# Patient Record
Sex: MALE | Race: WHITE | NOT HISPANIC OR LATINO
[De-identification: names, ages, dates, MRNs, and addresses within clinical notes are randomized per-mention and may not be internally consistent; named-entity substitution may affect disease eponyms.]

---

## 2017-01-24 ENCOUNTER — FORM ENCOUNTER (OUTPATIENT)
Age: 62
End: 2017-01-24

## 2017-01-25 ENCOUNTER — APPOINTMENT (OUTPATIENT)
Dept: ORTHOPEDIC SURGERY | Facility: CLINIC | Age: 62
End: 2017-01-25

## 2017-01-25 ENCOUNTER — OUTPATIENT (OUTPATIENT)
Dept: OUTPATIENT SERVICES | Facility: HOSPITAL | Age: 62
LOS: 1 days | End: 2017-01-25
Payer: COMMERCIAL

## 2017-01-25 DIAGNOSIS — Z98.89 OTHER SPECIFIED POSTPROCEDURAL STATES: Chronic | ICD-10-CM

## 2017-01-25 DIAGNOSIS — Z90.89 ACQUIRED ABSENCE OF OTHER ORGANS: Chronic | ICD-10-CM

## 2017-01-25 DIAGNOSIS — Z90.49 ACQUIRED ABSENCE OF OTHER SPECIFIED PARTS OF DIGESTIVE TRACT: Chronic | ICD-10-CM

## 2017-01-25 PROCEDURE — 72020 X-RAY EXAM OF SPINE 1 VIEW: CPT | Mod: 26

## 2017-01-25 PROCEDURE — 73521 X-RAY EXAM HIPS BI 2 VIEWS: CPT

## 2017-01-25 PROCEDURE — 73523 X-RAY EXAM HIPS BI 5/> VIEWS: CPT | Mod: 26

## 2017-01-25 PROCEDURE — 72020 X-RAY EXAM OF SPINE 1 VIEW: CPT

## 2017-02-21 ENCOUNTER — RX RENEWAL (OUTPATIENT)
Age: 62
End: 2017-02-21

## 2017-05-03 ENCOUNTER — APPOINTMENT (OUTPATIENT)
Dept: ORTHOPEDIC SURGERY | Facility: CLINIC | Age: 62
End: 2017-05-03

## 2017-06-11 ENCOUNTER — FORM ENCOUNTER (OUTPATIENT)
Age: 62
End: 2017-06-11

## 2017-06-12 ENCOUNTER — OUTPATIENT (OUTPATIENT)
Dept: OUTPATIENT SERVICES | Facility: HOSPITAL | Age: 62
LOS: 1 days | End: 2017-06-12
Payer: COMMERCIAL

## 2017-06-12 DIAGNOSIS — Z98.89 OTHER SPECIFIED POSTPROCEDURAL STATES: Chronic | ICD-10-CM

## 2017-06-12 DIAGNOSIS — M16.12 UNILATERAL PRIMARY OSTEOARTHRITIS, LEFT HIP: ICD-10-CM

## 2017-06-12 DIAGNOSIS — Z90.89 ACQUIRED ABSENCE OF OTHER ORGANS: Chronic | ICD-10-CM

## 2017-06-12 DIAGNOSIS — Z90.49 ACQUIRED ABSENCE OF OTHER SPECIFIED PARTS OF DIGESTIVE TRACT: Chronic | ICD-10-CM

## 2017-06-12 LAB
ANION GAP SERPL CALC-SCNC: 16 MMOL/L — SIGNIFICANT CHANGE UP (ref 5–17)
APPEARANCE UR: CLEAR — SIGNIFICANT CHANGE UP
APTT BLD: 22.6 SEC — LOW (ref 27.5–37.4)
BACTERIA # UR AUTO: (no result) /HPF
BILIRUB UR-MCNC: NEGATIVE — SIGNIFICANT CHANGE UP
BUN SERPL-MCNC: 11 MG/DL — SIGNIFICANT CHANGE UP (ref 7–23)
CALCIUM SERPL-MCNC: 9.5 MG/DL — SIGNIFICANT CHANGE UP (ref 8.4–10.5)
CHLORIDE SERPL-SCNC: 102 MMOL/L — SIGNIFICANT CHANGE UP (ref 96–108)
CO2 SERPL-SCNC: 24 MMOL/L — SIGNIFICANT CHANGE UP (ref 22–31)
COLOR SPEC: YELLOW — SIGNIFICANT CHANGE UP
CREAT SERPL-MCNC: 0.8 MG/DL — SIGNIFICANT CHANGE UP (ref 0.5–1.3)
DIFF PNL FLD: (no result)
EPI CELLS # UR: SIGNIFICANT CHANGE UP /HPF
GLUCOSE SERPL-MCNC: 99 MG/DL — SIGNIFICANT CHANGE UP (ref 70–99)
GLUCOSE UR QL: NEGATIVE — SIGNIFICANT CHANGE UP
HBA1C BLD-MCNC: 5.1 % — SIGNIFICANT CHANGE UP (ref 4–5.6)
HCT VFR BLD CALC: 42.9 % — SIGNIFICANT CHANGE UP (ref 39–50)
HGB BLD-MCNC: 14.5 G/DL — SIGNIFICANT CHANGE UP (ref 13–17)
INR BLD: 0.98 — SIGNIFICANT CHANGE UP (ref 0.88–1.16)
KETONES UR-MCNC: NEGATIVE — SIGNIFICANT CHANGE UP
LEUKOCYTE ESTERASE UR-ACNC: NEGATIVE — SIGNIFICANT CHANGE UP
MCHC RBC-ENTMCNC: 29.2 PG — SIGNIFICANT CHANGE UP (ref 27–34)
MCHC RBC-ENTMCNC: 33.8 G/DL — SIGNIFICANT CHANGE UP (ref 32–36)
MCV RBC AUTO: 86.5 FL — SIGNIFICANT CHANGE UP (ref 80–100)
NITRITE UR-MCNC: NEGATIVE — SIGNIFICANT CHANGE UP
PH UR: 5.5 — SIGNIFICANT CHANGE UP (ref 5–8)
PLATELET # BLD AUTO: 216 K/UL — SIGNIFICANT CHANGE UP (ref 150–400)
POTASSIUM SERPL-MCNC: 4.2 MMOL/L — SIGNIFICANT CHANGE UP (ref 3.5–5.3)
POTASSIUM SERPL-SCNC: 4.2 MMOL/L — SIGNIFICANT CHANGE UP (ref 3.5–5.3)
PROT UR-MCNC: NEGATIVE MG/DL — SIGNIFICANT CHANGE UP
PROTHROM AB SERPL-ACNC: 10.9 SEC — SIGNIFICANT CHANGE UP (ref 9.8–12.7)
RBC # BLD: 4.96 M/UL — SIGNIFICANT CHANGE UP (ref 4.2–5.8)
RBC # FLD: 13.1 % — SIGNIFICANT CHANGE UP (ref 10.3–16.9)
RBC CASTS # UR COMP ASSIST: (no result) /HPF
SODIUM SERPL-SCNC: 142 MMOL/L — SIGNIFICANT CHANGE UP (ref 135–145)
SP GR SPEC: 1.02 — SIGNIFICANT CHANGE UP (ref 1–1.03)
UROBILINOGEN FLD QL: 0.2 E.U./DL — SIGNIFICANT CHANGE UP
WBC # BLD: 7.4 K/UL — SIGNIFICANT CHANGE UP (ref 3.8–10.5)
WBC # FLD AUTO: 7.4 K/UL — SIGNIFICANT CHANGE UP (ref 3.8–10.5)
WBC UR QL: < 5 /HPF — SIGNIFICANT CHANGE UP

## 2017-06-12 PROCEDURE — 87086 URINE CULTURE/COLONY COUNT: CPT

## 2017-06-12 PROCEDURE — 93005 ELECTROCARDIOGRAM TRACING: CPT

## 2017-06-12 PROCEDURE — 81001 URINALYSIS AUTO W/SCOPE: CPT

## 2017-06-12 PROCEDURE — 85610 PROTHROMBIN TIME: CPT

## 2017-06-12 PROCEDURE — 83036 HEMOGLOBIN GLYCOSYLATED A1C: CPT

## 2017-06-12 PROCEDURE — 80048 BASIC METABOLIC PNL TOTAL CA: CPT

## 2017-06-12 PROCEDURE — 85730 THROMBOPLASTIN TIME PARTIAL: CPT

## 2017-06-12 PROCEDURE — 93010 ELECTROCARDIOGRAM REPORT: CPT

## 2017-06-12 PROCEDURE — 71046 X-RAY EXAM CHEST 2 VIEWS: CPT

## 2017-06-12 PROCEDURE — 85027 COMPLETE CBC AUTOMATED: CPT

## 2017-06-12 PROCEDURE — 71020: CPT | Mod: 26

## 2017-06-13 LAB
CULTURE RESULTS: NO GROWTH — SIGNIFICANT CHANGE UP
SPECIMEN SOURCE: SIGNIFICANT CHANGE UP

## 2017-06-14 ENCOUNTER — OUTPATIENT (OUTPATIENT)
Dept: OUTPATIENT SERVICES | Facility: HOSPITAL | Age: 62
LOS: 1 days | End: 2017-06-14
Payer: COMMERCIAL

## 2017-06-14 DIAGNOSIS — Z90.89 ACQUIRED ABSENCE OF OTHER ORGANS: Chronic | ICD-10-CM

## 2017-06-14 DIAGNOSIS — Z98.89 OTHER SPECIFIED POSTPROCEDURAL STATES: Chronic | ICD-10-CM

## 2017-06-14 DIAGNOSIS — Z22.321 CARRIER OR SUSPECTED CARRIER OF METHICILLIN SUSCEPTIBLE STAPHYLOCOCCUS AUREUS: ICD-10-CM

## 2017-06-14 DIAGNOSIS — Z90.49 ACQUIRED ABSENCE OF OTHER SPECIFIED PARTS OF DIGESTIVE TRACT: Chronic | ICD-10-CM

## 2017-06-14 PROCEDURE — 87641 MR-STAPH DNA AMP PROBE: CPT

## 2017-06-17 LAB
MRSA PCR RESULT.: NEGATIVE — SIGNIFICANT CHANGE UP
S AUREUS DNA NOSE QL NAA+PROBE: NEGATIVE — SIGNIFICANT CHANGE UP

## 2017-06-18 ENCOUNTER — FORM ENCOUNTER (OUTPATIENT)
Age: 62
End: 2017-06-18

## 2017-06-19 ENCOUNTER — OUTPATIENT (OUTPATIENT)
Dept: OUTPATIENT SERVICES | Facility: HOSPITAL | Age: 62
LOS: 1 days | End: 2017-06-19
Payer: COMMERCIAL

## 2017-06-19 ENCOUNTER — APPOINTMENT (OUTPATIENT)
Dept: INTERNAL MEDICINE | Facility: CLINIC | Age: 62
End: 2017-06-19

## 2017-06-19 VITALS
BODY MASS INDEX: 38.35 KG/M2 | OXYGEN SATURATION: 97 % | HEART RATE: 65 BPM | WEIGHT: 275 LBS | DIASTOLIC BLOOD PRESSURE: 80 MMHG | TEMPERATURE: 98.2 F | SYSTOLIC BLOOD PRESSURE: 110 MMHG

## 2017-06-19 DIAGNOSIS — M17.11 UNILATERAL PRIMARY OSTEOARTHRITIS, RIGHT KNEE: ICD-10-CM

## 2017-06-19 DIAGNOSIS — Z90.89 ACQUIRED ABSENCE OF OTHER ORGANS: Chronic | ICD-10-CM

## 2017-06-19 DIAGNOSIS — Z98.89 OTHER SPECIFIED POSTPROCEDURAL STATES: Chronic | ICD-10-CM

## 2017-06-19 DIAGNOSIS — Z90.49 ACQUIRED ABSENCE OF OTHER SPECIFIED PARTS OF DIGESTIVE TRACT: Chronic | ICD-10-CM

## 2017-06-19 DIAGNOSIS — Z79.01 LONG TERM (CURRENT) USE OF ANTICOAGULANTS: ICD-10-CM

## 2017-06-19 DIAGNOSIS — Z01.818 ENCOUNTER FOR OTHER PREPROCEDURAL EXAMINATION: ICD-10-CM

## 2017-06-19 DIAGNOSIS — A04.7 ENTEROCOLITIS DUE TO CLOSTRIDIUM DIFFICILE: ICD-10-CM

## 2017-06-19 PROCEDURE — 73700 CT LOWER EXTREMITY W/O DYE: CPT

## 2017-06-19 PROCEDURE — 73700 CT LOWER EXTREMITY W/O DYE: CPT | Mod: 26,LT

## 2017-06-19 RX ORDER — FLUOCINONIDE 0.05 MG/G
0.05 OINTMENT TOPICAL
Qty: 60 | Refills: 0 | Status: ACTIVE | COMMUNITY
Start: 2017-04-08

## 2017-06-19 RX ORDER — UREA 20 %
20 CREAM (GRAM) TOPICAL
Qty: 85 | Refills: 0 | Status: ACTIVE | COMMUNITY
Start: 2016-06-17

## 2017-06-21 ENCOUNTER — MEDICATION RENEWAL (OUTPATIENT)
Age: 62
End: 2017-06-21

## 2017-06-21 DIAGNOSIS — G89.29 PAIN IN LEFT HIP: ICD-10-CM

## 2017-06-21 DIAGNOSIS — M25.552 PAIN IN LEFT HIP: ICD-10-CM

## 2017-06-22 ENCOUNTER — OTHER (OUTPATIENT)
Age: 62
End: 2017-06-22

## 2017-06-27 VITALS
HEART RATE: 57 BPM | SYSTOLIC BLOOD PRESSURE: 127 MMHG | TEMPERATURE: 98 F | RESPIRATION RATE: 16 BRPM | OXYGEN SATURATION: 98 % | HEIGHT: 71 IN | DIASTOLIC BLOOD PRESSURE: 69 MMHG | WEIGHT: 263.23 LBS

## 2017-06-27 RX ORDER — CELECOXIB 200 MG/1
200 CAPSULE ORAL ONCE
Qty: 0 | Refills: 0 | Status: COMPLETED | OUTPATIENT
Start: 2017-06-28 | End: 2017-06-28

## 2017-06-27 RX ORDER — OXYCODONE HYDROCHLORIDE 5 MG/1
20 TABLET ORAL ONCE
Qty: 0 | Refills: 0 | Status: DISCONTINUED | OUTPATIENT
Start: 2017-06-28 | End: 2017-06-28

## 2017-06-27 NOTE — H&P ADULT - PSH
S/P appendectomy    S/P bladder repair  Bladder Resection  S/P colon resection    S/P tonsillectomy    Status post surgery  right knee cap repair- 1/22/2016, x3

## 2017-06-27 NOTE — H&P ADULT - HISTORY OF PRESENT ILLNESS
62M c/o left hip pain x    Presents today for elective left total hip replacement, robotic assisted 62M c/o left hip pain x 2 years, denies preceding trauma/injury. Pt states his left hip pain radiates to his low back and left knee. His hip pain is exacerbated with walking up and downhill, getting in and out of chairs, and states he has been walking with a limp secondary to pain. Pt states NSAIDs no longer provide him pain relief and he has been participating in physical therapy, pt has thus failed conservative treatment for his symptoms. Pt denies numbness/tingling/weakness of bilateral lower extremities. Pt does not ambulate with an assistive device at baseline. Denies DVT hx; Of note pt has hx of portal vein thrombosis in 2014 for which he was on lovenox x 2 years, pt is no longer on anticoagulation. Pt has hx of right patella ORIF and MONTY with Dr. Willard approximately 2 years ago. Denies CP, SOB, N/V, tactile fevers today.    Presents today for elective left total hip replacement, robotic assisted

## 2017-06-27 NOTE — H&P ADULT - PMH
Bladder neoplasm    BPH (benign prostatic hypertrophy)    Gastritis    GERD (gastroesophageal reflux disease)    OA (osteoarthritis)    Pneumonia  mild

## 2017-06-27 NOTE — PATIENT PROFILE ADULT. - PSH
S/P appendectomy    S/P bladder repair  Bladder Resection  S/P colon resection    S/P tonsillectomy    Status post surgery  right knee cap repair- 1/22/2016, x3  Status post surgery  cyst removal from the bladder-2011 S/P appendectomy    S/P bladder repair  Bladder Resection  S/P colon resection    S/P tonsillectomy    Status post surgery  right knee cap repair- 1/22/2016, x3

## 2017-06-27 NOTE — H&P ADULT - NSHPPHYSICALEXAM_GEN_ALL_CORE
MSK:  +decreased ROM secondary to pain, left hip    Remainder of exam per medical clearance note MSK:  +decreased ROM secondary to pain, left hip  Skin warm and well perfused  EHL/FHL/TA/GS 5/5 motor strength bilateral lower extremities  SLT in tact and equal to distal bilateral lower extremities  DP pulses palpable bilaterally     Remainder of exam per medical clearance note

## 2017-06-27 NOTE — PATIENT PROFILE ADULT. - PMH
Bladder neoplasm    BPH (benign prostatic hypertrophy)    Gastritis    GERD (gastroesophageal reflux disease)    Pneumonia Bladder neoplasm    BPH (benign prostatic hypertrophy)    Gastritis    GERD (gastroesophageal reflux disease)    OA (osteoarthritis)    Pneumonia  mild

## 2017-06-27 NOTE — H&P ADULT - NSHPLABSRESULTS_GEN_ALL_CORE
Preop CBC, BMP, PT/PTT/INR, UA/UCX - WNL per medical clearance   Preop EKG - sinus bradycardia - reviewed by medical clearance   Preop CXR - small left pleural effusion - reviewed by medical clearance

## 2017-06-27 NOTE — H&P ADULT - PROBLEM SELECTOR PLAN 1
Admit to Orthopaedic Service.  Presents today for elective left total hip replacement  Pt medically stable and cleared for procedure today by Dr. Perdue

## 2017-06-28 ENCOUNTER — RESULT REVIEW (OUTPATIENT)
Age: 62
End: 2017-06-28

## 2017-06-28 ENCOUNTER — INPATIENT (INPATIENT)
Facility: HOSPITAL | Age: 62
LOS: 0 days | Discharge: HOME HEALTH | DRG: 470 | End: 2017-06-29
Attending: ORTHOPAEDIC SURGERY | Admitting: ORTHOPAEDIC SURGERY
Payer: COMMERCIAL

## 2017-06-28 ENCOUNTER — APPOINTMENT (OUTPATIENT)
Dept: ORTHOPEDIC SURGERY | Facility: HOSPITAL | Age: 62
End: 2017-06-28

## 2017-06-28 DIAGNOSIS — Z98.89 OTHER SPECIFIED POSTPROCEDURAL STATES: Chronic | ICD-10-CM

## 2017-06-28 DIAGNOSIS — Z90.49 ACQUIRED ABSENCE OF OTHER SPECIFIED PARTS OF DIGESTIVE TRACT: Chronic | ICD-10-CM

## 2017-06-28 DIAGNOSIS — M16.12 UNILATERAL PRIMARY OSTEOARTHRITIS, LEFT HIP: ICD-10-CM

## 2017-06-28 DIAGNOSIS — Z90.89 ACQUIRED ABSENCE OF OTHER ORGANS: Chronic | ICD-10-CM

## 2017-06-28 PROCEDURE — 72170 X-RAY EXAM OF PELVIS: CPT | Mod: 26

## 2017-06-28 PROCEDURE — 27130 TOTAL HIP ARTHROPLASTY: CPT | Mod: LT

## 2017-06-28 RX ORDER — MAGNESIUM HYDROXIDE 400 MG/1
30 TABLET, CHEWABLE ORAL DAILY
Qty: 0 | Refills: 0 | Status: DISCONTINUED | OUTPATIENT
Start: 2017-06-28 | End: 2017-06-29

## 2017-06-28 RX ORDER — POLYETHYLENE GLYCOL 3350 17 G/17G
17 POWDER, FOR SOLUTION ORAL DAILY
Qty: 0 | Refills: 0 | Status: DISCONTINUED | OUTPATIENT
Start: 2017-06-28 | End: 2017-06-29

## 2017-06-28 RX ORDER — TAMSULOSIN HYDROCHLORIDE 0.4 MG/1
0.4 CAPSULE ORAL AT BEDTIME
Qty: 0 | Refills: 0 | Status: DISCONTINUED | OUTPATIENT
Start: 2017-06-28 | End: 2017-06-28

## 2017-06-28 RX ORDER — SENNA PLUS 8.6 MG/1
2 TABLET ORAL AT BEDTIME
Qty: 0 | Refills: 0 | Status: DISCONTINUED | OUTPATIENT
Start: 2017-06-28 | End: 2017-06-29

## 2017-06-28 RX ORDER — ACETAMINOPHEN 500 MG
650 TABLET ORAL EVERY 6 HOURS
Qty: 0 | Refills: 0 | Status: DISCONTINUED | OUTPATIENT
Start: 2017-06-28 | End: 2017-06-29

## 2017-06-28 RX ORDER — PANTOPRAZOLE SODIUM 20 MG/1
40 TABLET, DELAYED RELEASE ORAL DAILY
Qty: 0 | Refills: 0 | Status: DISCONTINUED | OUTPATIENT
Start: 2017-06-28 | End: 2017-06-29

## 2017-06-28 RX ORDER — MORPHINE SULFATE 50 MG/1
4 CAPSULE, EXTENDED RELEASE ORAL
Qty: 0 | Refills: 0 | Status: DISCONTINUED | OUTPATIENT
Start: 2017-06-28 | End: 2017-06-29

## 2017-06-28 RX ORDER — TAMSULOSIN HYDROCHLORIDE 0.4 MG/1
0.4 CAPSULE ORAL
Qty: 0 | Refills: 0 | Status: DISCONTINUED | OUTPATIENT
Start: 2017-06-28 | End: 2017-06-29

## 2017-06-28 RX ORDER — MORPHINE SULFATE 50 MG/1
4 CAPSULE, EXTENDED RELEASE ORAL EVERY 4 HOURS
Qty: 0 | Refills: 0 | Status: DISCONTINUED | OUTPATIENT
Start: 2017-06-28 | End: 2017-06-29

## 2017-06-28 RX ORDER — OXYCODONE HYDROCHLORIDE 5 MG/1
5 TABLET ORAL EVERY 4 HOURS
Qty: 0 | Refills: 0 | Status: DISCONTINUED | OUTPATIENT
Start: 2017-06-28 | End: 2017-06-29

## 2017-06-28 RX ORDER — ONDANSETRON 8 MG/1
4 TABLET, FILM COATED ORAL EVERY 6 HOURS
Qty: 0 | Refills: 0 | Status: DISCONTINUED | OUTPATIENT
Start: 2017-06-28 | End: 2017-06-29

## 2017-06-28 RX ORDER — KETOROLAC TROMETHAMINE 30 MG/ML
15 SYRINGE (ML) INJECTION EVERY 4 HOURS
Qty: 0 | Refills: 0 | Status: DISCONTINUED | OUTPATIENT
Start: 2017-06-28 | End: 2017-06-29

## 2017-06-28 RX ORDER — BENZOCAINE AND MENTHOL 5; 1 G/100ML; G/100ML
1 LIQUID ORAL THREE TIMES A DAY
Qty: 0 | Refills: 0 | Status: DISCONTINUED | OUTPATIENT
Start: 2017-06-28 | End: 2017-06-29

## 2017-06-28 RX ORDER — CELECOXIB 200 MG/1
100 CAPSULE ORAL
Qty: 0 | Refills: 0 | Status: DISCONTINUED | OUTPATIENT
Start: 2017-06-28 | End: 2017-06-29

## 2017-06-28 RX ORDER — BUPIVACAINE 13.3 MG/ML
20 INJECTION, SUSPENSION, LIPOSOMAL INFILTRATION ONCE
Qty: 0 | Refills: 0 | Status: DISCONTINUED | OUTPATIENT
Start: 2017-06-28 | End: 2017-06-29

## 2017-06-28 RX ORDER — CEFAZOLIN SODIUM 1 G
2000 VIAL (EA) INJECTION EVERY 8 HOURS
Qty: 0 | Refills: 0 | Status: COMPLETED | OUTPATIENT
Start: 2017-06-28 | End: 2017-06-29

## 2017-06-28 RX ORDER — SODIUM CHLORIDE 9 MG/ML
1000 INJECTION, SOLUTION INTRAVENOUS
Qty: 0 | Refills: 0 | Status: DISCONTINUED | OUTPATIENT
Start: 2017-06-28 | End: 2017-06-29

## 2017-06-28 RX ORDER — DOCUSATE SODIUM 100 MG
100 CAPSULE ORAL THREE TIMES A DAY
Qty: 0 | Refills: 0 | Status: DISCONTINUED | OUTPATIENT
Start: 2017-06-28 | End: 2017-06-29

## 2017-06-28 RX ORDER — ENOXAPARIN SODIUM 100 MG/ML
30 INJECTION SUBCUTANEOUS EVERY 12 HOURS
Qty: 0 | Refills: 0 | Status: DISCONTINUED | OUTPATIENT
Start: 2017-06-28 | End: 2017-06-29

## 2017-06-28 RX ORDER — OXYCODONE HYDROCHLORIDE 5 MG/1
10 TABLET ORAL EVERY 4 HOURS
Qty: 0 | Refills: 0 | Status: DISCONTINUED | OUTPATIENT
Start: 2017-06-28 | End: 2017-06-29

## 2017-06-28 RX ADMIN — OXYCODONE HYDROCHLORIDE 10 MILLIGRAM(S): 5 TABLET ORAL at 21:36

## 2017-06-28 RX ADMIN — OXYCODONE HYDROCHLORIDE 20 MILLIGRAM(S): 5 TABLET ORAL at 06:58

## 2017-06-28 RX ADMIN — TAMSULOSIN HYDROCHLORIDE 0.4 MILLIGRAM(S): 0.4 CAPSULE ORAL at 15:58

## 2017-06-28 RX ADMIN — CELECOXIB 200 MILLIGRAM(S): 200 CAPSULE ORAL at 06:58

## 2017-06-28 RX ADMIN — SENNA PLUS 2 TABLET(S): 8.6 TABLET ORAL at 21:36

## 2017-06-28 RX ADMIN — Medication 100 MILLIGRAM(S): at 21:36

## 2017-06-28 RX ADMIN — OXYCODONE HYDROCHLORIDE 5 MILLIGRAM(S): 5 TABLET ORAL at 16:51

## 2017-06-28 RX ADMIN — Medication 100 MILLIGRAM(S): at 16:20

## 2017-06-28 RX ADMIN — OXYCODONE HYDROCHLORIDE 10 MILLIGRAM(S): 5 TABLET ORAL at 22:36

## 2017-06-28 RX ADMIN — OXYCODONE HYDROCHLORIDE 5 MILLIGRAM(S): 5 TABLET ORAL at 15:51

## 2017-06-28 NOTE — PHYSICAL THERAPY INITIAL EVALUATION ADULT - CRITERIA FOR SKILLED THERAPEUTIC INTERVENTIONS
anticipated discharge recommendation/rehab potential/anticipated equipment needs at discharge/impairments found/risk reduction/prevention/therapy frequency/functional limitations in following categories/predicted duration of therapy intervention

## 2017-06-28 NOTE — PHYSICAL THERAPY INITIAL EVALUATION ADULT - ASR EQUIP NEEDS DISCH PT EVAL
Patient owns axillary crutches, straight cane and commode. Patient will need hip kit prior to discharge (hip kit ordered via [facility]). Will assess patient using crutches next session to determine if further equipment is needed

## 2017-06-28 NOTE — PHYSICAL THERAPY INITIAL EVALUATION ADULT - ADDITIONAL COMMENTS
Patient lives with his wife in an apartment, elevator access, no stairs to enter, however patient reports the building does have stairs. Patient reports independence with all functional mobility without the use of an assistive device prior to hospitalization. Patient with a patellar surgery last year and owns a commode, axillary crutches and cane from the surgery.

## 2017-06-28 NOTE — PROGRESS NOTE ADULT - SUBJECTIVE AND OBJECTIVE BOX
Orthopaedic Post Op Check Note    Procedure: Left Total Hip Arthroplasty, Posterior approach  Surgeon: Dr. Ramos    62y Male comfortable, stable and alert in PACU. Pain control adequate at this time. Pt getting up for ambulation with PT as I left the patient. Denies N/V, CP, SOB, numbness/tingling of extremities.    PE: AVSS, NAD  Vital Signs Last 24 Hrs  T(C): 36.1 (28 Jun 2017 13:57), Max: 36.3 (28 Jun 2017 11:20)  T(F): 97 (28 Jun 2017 13:57), Max: 97.4 (28 Jun 2017 12:45)  HR: 53 (28 Jun 2017 13:57) (47 - 69)  BP: 115/70 (28 Jun 2017 13:57) (92/52 - 115/70)  BP(mean): --  RR: 16 (28 Jun 2017 13:57) (14 - 18)  SpO2: 99% (28 Jun 2017 13:57) (94% - 99%)    General: Pt alert and oriented, seated on hospital bed in NAD.  Dressing: C/D/I to Left posterior hip.  Motor: Motor Strength 5/5 to Quad/TA/GS/EHL/FHL bilaterally.   Neuro: Sensation intact to bilateral LE distally.   Pulses: DP/PT 2+, Brisk cap refill, Toes wwp.    Post op X-Ray: new left total hip prosthesis in place without fracture or foreign body     A/P: 62y Male POD #0 s/p Left Total Hip Arthroplasty, posterior approach.  - Stable  - Pain Control   - DVT ppx: Lovenox 30mg subQ BID  - Julia op IV abx: Ancef  - PT, WBS: WBAT, fast track.  - IVF: LR at 100cc/hr.  - F/U AM Labs    Fay Valladares PA-C  Ortho Consult Pager: [phone]

## 2017-06-28 NOTE — PHYSICAL THERAPY INITIAL EVALUATION ADULT - ACTIVE RANGE OF MOTION EXAMINATION, REHAB EVAL
Left UE Active ROM was WFL (within functional limits)/Right LE Active ROM was WFL (within functional limits)/left hip flexion limited to 90 degrees secondary to posterior hip precautions/Right UE Active ROM was WFL (within functional limits)

## 2017-06-29 ENCOUNTER — TRANSCRIPTION ENCOUNTER (OUTPATIENT)
Age: 62
End: 2017-06-29

## 2017-06-29 VITALS
TEMPERATURE: 98 F | SYSTOLIC BLOOD PRESSURE: 126 MMHG | OXYGEN SATURATION: 97 % | HEART RATE: 73 BPM | RESPIRATION RATE: 16 BRPM | DIASTOLIC BLOOD PRESSURE: 74 MMHG

## 2017-06-29 LAB
ANION GAP SERPL CALC-SCNC: 9 MMOL/L — SIGNIFICANT CHANGE UP (ref 5–17)
BUN SERPL-MCNC: 14 MG/DL — SIGNIFICANT CHANGE UP (ref 7–23)
CALCIUM SERPL-MCNC: 8.5 MG/DL — SIGNIFICANT CHANGE UP (ref 8.4–10.5)
CHLORIDE SERPL-SCNC: 103 MMOL/L — SIGNIFICANT CHANGE UP (ref 96–108)
CO2 SERPL-SCNC: 23 MMOL/L — SIGNIFICANT CHANGE UP (ref 22–31)
CREAT SERPL-MCNC: 0.8 MG/DL — SIGNIFICANT CHANGE UP (ref 0.5–1.3)
GLUCOSE SERPL-MCNC: 124 MG/DL — HIGH (ref 70–99)
HCT VFR BLD CALC: 32.2 % — LOW (ref 39–50)
HGB BLD-MCNC: 11.1 G/DL — LOW (ref 13–17)
MCHC RBC-ENTMCNC: 29.8 PG — SIGNIFICANT CHANGE UP (ref 27–34)
MCHC RBC-ENTMCNC: 34.5 G/DL — SIGNIFICANT CHANGE UP (ref 32–36)
MCV RBC AUTO: 86.6 FL — SIGNIFICANT CHANGE UP (ref 80–100)
PLATELET # BLD AUTO: 205 K/UL — SIGNIFICANT CHANGE UP (ref 150–400)
POTASSIUM SERPL-MCNC: 4 MMOL/L — SIGNIFICANT CHANGE UP (ref 3.5–5.3)
POTASSIUM SERPL-SCNC: 4 MMOL/L — SIGNIFICANT CHANGE UP (ref 3.5–5.3)
RBC # BLD: 3.72 M/UL — LOW (ref 4.2–5.8)
RBC # FLD: 13.1 % — SIGNIFICANT CHANGE UP (ref 10.3–16.9)
SODIUM SERPL-SCNC: 135 MMOL/L — SIGNIFICANT CHANGE UP (ref 135–145)
WBC # BLD: 7.2 K/UL — SIGNIFICANT CHANGE UP (ref 3.8–10.5)
WBC # FLD AUTO: 7.2 K/UL — SIGNIFICANT CHANGE UP (ref 3.8–10.5)

## 2017-06-29 PROCEDURE — 99253 IP/OBS CNSLTJ NEW/EST LOW 45: CPT

## 2017-06-29 RX ORDER — MAGNESIUM HYDROXIDE 400 MG/1
30 TABLET, CHEWABLE ORAL
Qty: 0 | Refills: 0 | COMMUNITY
Start: 2017-06-29

## 2017-06-29 RX ORDER — ENOXAPARIN SODIUM 100 MG/ML
30 INJECTION SUBCUTANEOUS
Qty: 0 | Refills: 0 | COMMUNITY
Start: 2017-06-29

## 2017-06-29 RX ORDER — DOCUSATE SODIUM 100 MG
1 CAPSULE ORAL
Qty: 0 | Refills: 0 | COMMUNITY
Start: 2017-06-29

## 2017-06-29 RX ORDER — DICLOFENAC SODIUM 75 MG/1
1 TABLET, DELAYED RELEASE ORAL
Qty: 0 | Refills: 0 | COMMUNITY

## 2017-06-29 RX ORDER — FLUOCINOLONE ACETONIDE 0.25 MG/G
0 CREAM TOPICAL
Qty: 0 | Refills: 0 | COMMUNITY

## 2017-06-29 RX ORDER — CELECOXIB 200 MG/1
0 CAPSULE ORAL
Qty: 0 | Refills: 0 | COMMUNITY

## 2017-06-29 RX ORDER — UREA 15 G
0 POWDER IN PACKET (EA) ORAL
Qty: 0 | Refills: 0 | COMMUNITY

## 2017-06-29 RX ORDER — FERROUS SULFATE 325(65) MG
1 TABLET ORAL
Qty: 0 | Refills: 0 | COMMUNITY

## 2017-06-29 RX ADMIN — TAMSULOSIN HYDROCHLORIDE 0.4 MILLIGRAM(S): 0.4 CAPSULE ORAL at 06:07

## 2017-06-29 RX ADMIN — Medication 15 MILLIGRAM(S): at 03:05

## 2017-06-29 RX ADMIN — OXYCODONE HYDROCHLORIDE 10 MILLIGRAM(S): 5 TABLET ORAL at 11:09

## 2017-06-29 RX ADMIN — PANTOPRAZOLE SODIUM 40 MILLIGRAM(S): 20 TABLET, DELAYED RELEASE ORAL at 11:09

## 2017-06-29 RX ADMIN — OXYCODONE HYDROCHLORIDE 10 MILLIGRAM(S): 5 TABLET ORAL at 12:09

## 2017-06-29 RX ADMIN — Medication 15 MILLIGRAM(S): at 09:00

## 2017-06-29 RX ADMIN — Medication 100 MILLIGRAM(S): at 06:07

## 2017-06-29 RX ADMIN — OXYCODONE HYDROCHLORIDE 10 MILLIGRAM(S): 5 TABLET ORAL at 02:50

## 2017-06-29 RX ADMIN — Medication 650 MILLIGRAM(S): at 06:11

## 2017-06-29 RX ADMIN — Medication 100 MILLIGRAM(S): at 11:09

## 2017-06-29 RX ADMIN — Medication 15 MILLIGRAM(S): at 08:45

## 2017-06-29 RX ADMIN — Medication 100 MILLIGRAM(S): at 00:39

## 2017-06-29 RX ADMIN — OXYCODONE HYDROCHLORIDE 10 MILLIGRAM(S): 5 TABLET ORAL at 06:07

## 2017-06-29 RX ADMIN — Medication 15 MILLIGRAM(S): at 02:47

## 2017-06-29 RX ADMIN — OXYCODONE HYDROCHLORIDE 10 MILLIGRAM(S): 5 TABLET ORAL at 02:00

## 2017-06-29 RX ADMIN — ENOXAPARIN SODIUM 30 MILLIGRAM(S): 100 INJECTION SUBCUTANEOUS at 06:07

## 2017-06-29 RX ADMIN — POLYETHYLENE GLYCOL 3350 17 GRAM(S): 17 POWDER, FOR SOLUTION ORAL at 11:09

## 2017-06-29 NOTE — CONSULT NOTE ADULT - ASSESSMENT
61 yo m s/p left THR, hx of bph, gerd  - hx of protal vein thrombisos  now on lovenox for dvt ppx  - continue flomax  - pain management

## 2017-06-29 NOTE — DISCHARGE NOTE ADULT - MEDICATION SUMMARY - MEDICATIONS TO TAKE
I will START or STAY ON the medications listed below when I get home from the hospital:    magnesium hydroxide 8% oral suspension  -- 30 milliliter(s) by mouth once a day, As needed, Constipation  -- Indication: For Prevent constipation    Flomax 0.4 mg oral capsule  -- 1 cap(s) by mouth 2 times a day  -- Indication: For Home meds    enoxaparin  -- 30 milligram(s) injectable 2 times a day  -- Indication: For Prevent blood clot    docusate sodium 100 mg oral capsule  -- 1 cap(s) by mouth 3 times a day  -- Indication: For stool softener

## 2017-06-29 NOTE — DISCHARGE NOTE ADULT - CARE PROVIDER_API CALL
Fortunato Ramos), Orthopaedic Surgery  [address] [place] [address]  Phone: [phone]  Fax: [phone]

## 2017-06-29 NOTE — OCCUPATIONAL THERAPY INITIAL EVALUATION ADULT - ADDITIONAL COMMENTS
Per patient he was independent with ADL and ambulation PTA, has cane from previous RLE injury, sock aid and raised toilet seat.

## 2017-06-29 NOTE — PROGRESS NOTE ADULT - SUBJECTIVE AND OBJECTIVE BOX
Did well o/n. Mild pain.     Procedure: Left Total Hip Arthroplasty, Posterior approach  Surgeon: Dr. Ramos    62y Male Denies N/V, CP, SOB, numbness/tingling of extremities.    PE: AVSS, NAD  Vital Signs Last 24 Hrs  T(C): 38.1 (29 Jun 2017 04:48), Max: 38.1 (29 Jun 2017 04:48)  T(F): 100.5 (29 Jun 2017 04:48), Max: 100.5 (29 Jun 2017 04:48)  HR: 67 (29 Jun 2017 04:48) (47 - 73)  BP: 104/48 (29 Jun 2017 04:48) (92/52 - 121/69)  BP(mean): --  RR: 16 (29 Jun 2017 04:48) (14 - 18)  SpO2: 97% (29 Jun 2017 04:48) (94% - 99%)    General: Pt alert and oriented, NAD.  Dressing: C/D/I to Left posterior hip.  Motor: Motor Strength 5/5 to Quad/TA/GS/EHL/FHL bilaterally.   Neuro: Sensation intact to bilateral LE distally.   Pulses: DP/PT 2+, Brisk cap refill, Toes wwp.      A/P: 62y Male POD #0 s/p Left Total Hip Arthroplasty, posterior approach.  - Stable  - Pain Control   - DVT ppx: Lovenox 30mg subQ BID  - Julia op IV abx: Ancef  - PT, WBS: WBAT, fast track.  - F/U AM Labs

## 2017-06-29 NOTE — CONSULT NOTE ADULT - SUBJECTIVE AND OBJECTIVE BOX
Patient seen and examined, Chart reviewed    HPI:  62M c/o left hip pain x 2 years, denies preceding trauma/injury. Pt states his left hip pain radiates to his low back and left knee. His hip pain is exacerbated with walking up and downhill, getting in and out of chairs, and states he has been walking with a limp secondary to pain. Pt states NSAIDs no longer provide him pain relief and he has been participating in physical therapy, pt has thus failed conservative treatment for his symptoms. Pt denies numbness/tingling/weakness of bilateral lower extremities. Pt does not ambulate with an assistive device at baseline. Denies DVT hx; Of note pt has hx of portal vein thrombosis in 2014 for which he was on lovenox x 2 years, pt is no longer on anticoagulation. Pt has hx of right patella ORIF and MONTY with Dr. Willard approximately 2 years ago. Denies CP, SOB, N/V, tactile fevers today.    Presents today for elective left total hip replacement, robotic assisted      PAST MEDICAL & SURGICAL HISTORY:  OA (osteoarthritis)  Pneumonia: mild  Bladder neoplasm  Gastritis  GERD (gastroesophageal reflux disease)  BPH (benign prostatic hypertrophy)  Status post surgery: right knee cap repair- 1/22/2016, x3  S/P bladder repair: Bladder Resection  S/P tonsillectomy  S/P appendectomy  S/P colon resection      REVIEW OF SYSTEMS:  CONSTITUTIONAL:  No night sweats.  pos fatigue,  No fever or chills.  RESPIRATORY:  No cough.  No wheeze.    No shortness of breath.  CARDIOVASCULAR:  No chest pains.  No palpitations.  GASTROINTESTINAL:  No abdominal pain.  No nausea or vomiting.  No diarrhea or constipation.    GENITOURINARY:  No urgency.  No frequency.  No dysuria.  No hematuria.    MUSCULOSKELETAL:  pain present both legs    SKIN:  No rashes.      lactated ringers. 1000 milliLiter(s) IV Continuous <Continuous>  enoxaparin Injectable 30 milliGRAM(s) SubCutaneous every 12 hours  acetaminophen   Tablet 650 milliGRAM(s) Oral every 6 hours PRN  celecoxib 100 milliGRAM(s) Oral two times a day after meals  ketorolac   Injectable 15 milliGRAM(s) IV Push every 4 hours PRN  oxyCODONE IR 5 milliGRAM(s) Oral every 4 hours PRN  oxyCODONE IR 10 milliGRAM(s) Oral every 4 hours PRN  aluminum hydroxide/magnesium hydroxide/simethicone Suspension 30 milliLiter(s) Oral four times a day PRN  ondansetron Injectable 4 milliGRAM(s) IV Push every 6 hours PRN  pantoprazole    Tablet 40 milliGRAM(s) Oral daily  polyethylene glycol 3350 17 Gram(s) Oral daily  senna 2 Tablet(s) Oral at bedtime  magnesium hydroxide Suspension 30 milliLiter(s) Oral daily PRN  docusate sodium 100 milliGRAM(s) Oral three times a day  benzocaine 15 mG/menthol 3.6 mG Lozenge 1 Lozenge Oral three times a day PRN  artificial  tears Solution 1 Drop(s) Both EYES three times a day PRN  morphine  - Injectable 4 milliGRAM(s) IV Push every 15 minutes PRN  acetaminophen   Tablet. 650 milliGRAM(s) Oral every 6 hours PRN  morphine  - Injectable 4 milliGRAM(s) IV Push every 4 hours PRN  BUpivacaine liposome 1.3% Injectable (no eMAR) 20 milliLiter(s) Local Injection once  tamsulosin 0.4 milliGRAM(s) Oral two times a day      Allergies    No Known Allergies      SOCIAL HISTORY: no tob    FAMILY HISTORY:  No pertinent family history in first degree relatives      Vital Signs Last 24 Hrs  T(C): 36.7 (29 Jun 2017 15:34), Max: 38.1 (29 Jun 2017 04:48)  T(F): 98.1 (29 Jun 2017 15:34), Max: 100.5 (29 Jun 2017 04:48)  HR: 73 (29 Jun 2017 15:34) (65 - 73)  BP: 126/74 (29 Jun 2017 15:34) (100/63 - 126/74)  BP(mean): --  RR: 16 (29 Jun 2017 15:34) (16 - 17)  SpO2: 97% (29 Jun 2017 15:34) (96% - 97%)    06-28 @ 07:01 - 06-29 @ 07:00  --------------------------------------------------------  IN: 775 mL / OUT: 1200 mL / NET: -425 mL    06-29 @ 07:01 - 06-29 @ 20:50  --------------------------------------------------------  IN: 0 mL / OUT: 650 mL / NET: -650 mL        PHYSICAL EXAM:   General - NAD, Alert and oriented x 3,   Neck - - No lymphadenopathy  Cardiovascular - RRR no m/r/g, no JVD  Lungs - Clear to auscltation, no use of acessory muscles, no crackles or wheezes.  Skin - No rashes, skin warm and dry, no erythematous areas  Abdomen - Normal bowel sounds, abdomen soft and nontender  Extremeties - No edema,       LABS:                        11.1   7.2   )-----------( 205      ( 29 Jun 2017 06:06 )             32.2     06-29    135  |  103  |  14  ----------------------------<  124<H>  4.0   |  23  |  0.80    Ca    8.5      29 Jun 2017 06:08            RADIOLOGY & ADDITIONAL STUDIES:

## 2017-06-29 NOTE — DISCHARGE NOTE ADULT - PATIENT PORTAL LINK FT
“You can access the Follow[employer] Patient Portal, offered by [facility], by registering with the following website: [email or web address]”

## 2017-06-29 NOTE — OCCUPATIONAL THERAPY INITIAL EVALUATION ADULT - GENERAL OBSERVATIONS, REHAB EVAL
Right hand dominant. Chart reviewed, patient cleared for OT eval by GT Donald. Received seated in hip chair, NAD, +heplock, denies pain.

## 2017-06-29 NOTE — DISCHARGE NOTE ADULT - MEDICATION SUMMARY - MEDICATIONS TO STOP TAKING
I will STOP taking the medications listed below when I get home from the hospital:    naproxen 250 mg oral tablet  -- 1 tab(s) by mouth 2 times a day    Advil 200 mg oral tablet  -- 2 tab(s) by mouth prn, As Needed

## 2017-06-29 NOTE — OCCUPATIONAL THERAPY INITIAL EVALUATION ADULT - MD ORDER
62M c/o left hip pain x 2 years, denies preceding trauma/injury. Pt states his left hip pain radiates to his low back and left knee. His hip pain is exacerbated with walking up and downhill, getting in and out of chairs, and states he has been walking with a limp secondary to pain. Pt states NSAIDs no longer provide him pain relief and he has been participating in physical therapy, pt has thus failed conservative treatment for his symptoms.

## 2017-06-29 NOTE — DISCHARGE NOTE ADULT - HOSPITAL COURSE
Admit 6/28/17  Surgery S/P Left THR (Posterior)  Pre/post-op Antibiotics  DVT prophylaxis  Physical Therapy  Pain Management

## 2017-06-29 NOTE — DISCHARGE NOTE ADULT - ADDITIONAL INSTRUCTIONS
No strenuous activity, heavy lifting, driving, tub bathing, or returning to work until cleared by MD.  You may shower--dressing is waterproof.  Remove dressing after post op day 7, then leave incision open to air.  Follow up with  call to schedule an appt within 10-14 days.  If you don't have a bowel movement by post op day 3, then take Milk of Magnesia (over the counter).  If no bowel movement by at least post op day 5, then use a Dulcolax suppository (over the counter) and/or a Fleets enema--if still no bowel movement, call your MD.  Contact your doctor if you experience: fever greater than 101.5, chills, chest pain, difficulty breathing, bleeding, redness or heat around the incision.   Please take pain meds and Lovenox as prescribed by Dr Ramos.  Please follow up with your primary care provider.

## 2017-06-30 PROCEDURE — 36415 COLL VENOUS BLD VENIPUNCTURE: CPT

## 2017-06-30 PROCEDURE — 86850 RBC ANTIBODY SCREEN: CPT

## 2017-06-30 PROCEDURE — 80048 BASIC METABOLIC PNL TOTAL CA: CPT

## 2017-06-30 PROCEDURE — C1713: CPT

## 2017-06-30 PROCEDURE — 86900 BLOOD TYPING SEROLOGIC ABO: CPT

## 2017-06-30 PROCEDURE — 86901 BLOOD TYPING SEROLOGIC RH(D): CPT

## 2017-06-30 PROCEDURE — 85027 COMPLETE CBC AUTOMATED: CPT

## 2017-06-30 PROCEDURE — 72170 X-RAY EXAM OF PELVIS: CPT

## 2017-06-30 PROCEDURE — 88300 SURGICAL PATH GROSS: CPT

## 2017-06-30 PROCEDURE — C1776: CPT

## 2017-06-30 PROCEDURE — 97116 GAIT TRAINING THERAPY: CPT

## 2017-06-30 PROCEDURE — 97161 PT EVAL LOW COMPLEX 20 MIN: CPT

## 2017-07-03 DIAGNOSIS — Z87.891 PERSONAL HISTORY OF NICOTINE DEPENDENCE: ICD-10-CM

## 2017-07-03 DIAGNOSIS — N40.0 BENIGN PROSTATIC HYPERPLASIA WITHOUT LOWER URINARY TRACT SYMPTOMS: ICD-10-CM

## 2017-07-03 DIAGNOSIS — M16.12 UNILATERAL PRIMARY OSTEOARTHRITIS, LEFT HIP: ICD-10-CM

## 2017-07-03 DIAGNOSIS — R00.1 BRADYCARDIA, UNSPECIFIED: ICD-10-CM

## 2017-07-03 DIAGNOSIS — J90 PLEURAL EFFUSION, NOT ELSEWHERE CLASSIFIED: ICD-10-CM

## 2017-07-03 LAB — SURGICAL PATHOLOGY STUDY: SIGNIFICANT CHANGE UP

## 2017-07-05 PROBLEM — J18.9 PNEUMONIA, UNSPECIFIED ORGANISM: Chronic | Status: ACTIVE | Noted: 2017-06-27

## 2017-07-05 PROBLEM — M19.90 UNSPECIFIED OSTEOARTHRITIS, UNSPECIFIED SITE: Chronic | Status: ACTIVE | Noted: 2017-06-27

## 2017-07-09 ENCOUNTER — RX RENEWAL (OUTPATIENT)
Age: 62
End: 2017-07-09

## 2017-07-13 ENCOUNTER — MEDICATION RENEWAL (OUTPATIENT)
Age: 62
End: 2017-07-13

## 2017-07-14 ENCOUNTER — MEDICATION RENEWAL (OUTPATIENT)
Age: 62
End: 2017-07-14

## 2017-07-17 ENCOUNTER — MEDICATION RENEWAL (OUTPATIENT)
Age: 62
End: 2017-07-17

## 2017-07-19 ENCOUNTER — FORM ENCOUNTER (OUTPATIENT)
Age: 62
End: 2017-07-19

## 2017-07-20 ENCOUNTER — APPOINTMENT (OUTPATIENT)
Dept: ORTHOPEDIC SURGERY | Facility: CLINIC | Age: 62
End: 2017-07-20

## 2017-07-20 ENCOUNTER — OUTPATIENT (OUTPATIENT)
Dept: OUTPATIENT SERVICES | Facility: HOSPITAL | Age: 62
LOS: 1 days | End: 2017-07-20
Payer: COMMERCIAL

## 2017-07-20 DIAGNOSIS — Z98.89 OTHER SPECIFIED POSTPROCEDURAL STATES: Chronic | ICD-10-CM

## 2017-07-20 DIAGNOSIS — Z90.49 ACQUIRED ABSENCE OF OTHER SPECIFIED PARTS OF DIGESTIVE TRACT: Chronic | ICD-10-CM

## 2017-07-20 DIAGNOSIS — Z90.89 ACQUIRED ABSENCE OF OTHER ORGANS: Chronic | ICD-10-CM

## 2017-07-20 PROCEDURE — 73501 X-RAY EXAM HIP UNI 1 VIEW: CPT | Mod: 26,LT

## 2017-07-20 PROCEDURE — 73501 X-RAY EXAM HIP UNI 1 VIEW: CPT

## 2017-08-17 ENCOUNTER — APPOINTMENT (OUTPATIENT)
Dept: ORTHOPEDIC SURGERY | Facility: CLINIC | Age: 62
End: 2017-08-17
Payer: COMMERCIAL

## 2017-08-17 DIAGNOSIS — M16.12 UNILATERAL PRIMARY OSTEOARTHRITIS, LEFT HIP: ICD-10-CM

## 2017-08-17 PROCEDURE — 99024 POSTOP FOLLOW-UP VISIT: CPT

## 2017-08-17 RX ORDER — ENOXAPARIN SODIUM 100 MG/ML
30 INJECTION SUBCUTANEOUS
Qty: 6 | Refills: 0 | Status: DISCONTINUED | COMMUNITY
Start: 2017-06-21 | End: 2017-08-17

## 2017-08-17 RX ORDER — NAPROXEN 500 MG/1
500 TABLET ORAL
Qty: 60 | Refills: 0 | Status: DISCONTINUED | COMMUNITY
Start: 2017-01-26 | End: 2017-08-17

## 2017-08-17 RX ORDER — DICLOFENAC POTASSIUM 50 MG/1
50 TABLET, COATED ORAL
Qty: 10 | Refills: 0 | Status: DISCONTINUED | COMMUNITY
Start: 2017-05-17 | End: 2017-08-17

## 2017-08-17 RX ORDER — DICLOFENAC SODIUM 75 MG/1
75 TABLET, DELAYED RELEASE ORAL
Qty: 60 | Refills: 0 | Status: DISCONTINUED | COMMUNITY
Start: 2017-02-01 | End: 2017-08-17

## 2017-08-17 RX ORDER — OXYCODONE AND ACETAMINOPHEN 5; 325 MG/1; MG/1
5-325 TABLET ORAL
Qty: 50 | Refills: 0 | Status: DISCONTINUED | COMMUNITY
Start: 2017-06-21 | End: 2017-08-17

## 2017-08-30 ENCOUNTER — MEDICATION RENEWAL (OUTPATIENT)
Age: 62
End: 2017-08-30

## 2017-09-21 ENCOUNTER — MEDICATION RENEWAL (OUTPATIENT)
Age: 62
End: 2017-09-21

## 2017-09-28 ENCOUNTER — MESSAGE (OUTPATIENT)
Age: 62
End: 2017-09-28

## 2017-11-28 ENCOUNTER — APPOINTMENT (OUTPATIENT)
Dept: ORTHOPEDIC SURGERY | Facility: CLINIC | Age: 62
End: 2017-11-28
Payer: COMMERCIAL

## 2017-11-28 PROCEDURE — 99212 OFFICE O/P EST SF 10 MIN: CPT

## 2017-11-28 RX ORDER — CELECOXIB 100 MG/1
100 CAPSULE ORAL DAILY
Qty: 42 | Refills: 0 | Status: DISCONTINUED | COMMUNITY
Start: 2017-06-21 | End: 2017-11-28

## 2017-11-28 RX ORDER — CELECOXIB 200 MG/1
200 CAPSULE ORAL
Qty: 60 | Refills: 1 | Status: DISCONTINUED | COMMUNITY
Start: 2017-07-17 | End: 2017-11-28

## 2017-11-28 RX ORDER — CELECOXIB 200 MG/1
200 CAPSULE ORAL
Qty: 60 | Refills: 0 | Status: DISCONTINUED | COMMUNITY
Start: 2017-04-10 | End: 2017-11-28

## 2018-04-09 ENCOUNTER — RX RENEWAL (OUTPATIENT)
Age: 63
End: 2018-04-09

## 2018-04-18 ENCOUNTER — FORM ENCOUNTER (OUTPATIENT)
Age: 63
End: 2018-04-18

## 2018-04-19 ENCOUNTER — APPOINTMENT (OUTPATIENT)
Dept: ORTHOPEDIC SURGERY | Facility: CLINIC | Age: 63
End: 2018-04-19
Payer: COMMERCIAL

## 2018-04-19 ENCOUNTER — OUTPATIENT (OUTPATIENT)
Dept: OUTPATIENT SERVICES | Facility: HOSPITAL | Age: 63
LOS: 1 days | End: 2018-04-19
Payer: COMMERCIAL

## 2018-04-19 VITALS — HEIGHT: 71 IN | WEIGHT: 240 LBS | BODY MASS INDEX: 33.6 KG/M2

## 2018-04-19 DIAGNOSIS — M76.32 ILIOTIBIAL BAND SYNDROME, LEFT LEG: ICD-10-CM

## 2018-04-19 DIAGNOSIS — Z47.1 AFTERCARE FOLLOWING JOINT REPLACEMENT SURGERY: ICD-10-CM

## 2018-04-19 DIAGNOSIS — Z98.89 OTHER SPECIFIED POSTPROCEDURAL STATES: Chronic | ICD-10-CM

## 2018-04-19 DIAGNOSIS — Z90.49 ACQUIRED ABSENCE OF OTHER SPECIFIED PARTS OF DIGESTIVE TRACT: Chronic | ICD-10-CM

## 2018-04-19 DIAGNOSIS — Z96.642 AFTERCARE FOLLOWING JOINT REPLACEMENT SURGERY: ICD-10-CM

## 2018-04-19 DIAGNOSIS — Z90.89 ACQUIRED ABSENCE OF OTHER ORGANS: Chronic | ICD-10-CM

## 2018-04-19 PROCEDURE — 73502 X-RAY EXAM HIP UNI 2-3 VIEWS: CPT

## 2018-04-19 PROCEDURE — 99214 OFFICE O/P EST MOD 30 MIN: CPT

## 2018-04-19 PROCEDURE — 73502 X-RAY EXAM HIP UNI 2-3 VIEWS: CPT | Mod: 26,LT

## 2018-04-19 RX ORDER — FINASTERIDE 5 MG/1
5 TABLET, FILM COATED ORAL
Qty: 30 | Refills: 0 | Status: DISCONTINUED | COMMUNITY
Start: 2017-05-22 | End: 2018-04-19

## 2018-06-27 ENCOUNTER — FORM ENCOUNTER (OUTPATIENT)
Age: 63
End: 2018-06-27

## 2018-06-28 ENCOUNTER — OUTPATIENT (OUTPATIENT)
Dept: OUTPATIENT SERVICES | Facility: HOSPITAL | Age: 63
LOS: 1 days | End: 2018-06-28
Payer: COMMERCIAL

## 2018-06-28 ENCOUNTER — APPOINTMENT (OUTPATIENT)
Dept: ORTHOPEDIC SURGERY | Facility: CLINIC | Age: 63
End: 2018-06-28
Payer: COMMERCIAL

## 2018-06-28 VITALS — BODY MASS INDEX: 32.9 KG/M2 | HEIGHT: 71 IN | WEIGHT: 235 LBS

## 2018-06-28 DIAGNOSIS — M16.11 UNILATERAL PRIMARY OSTEOARTHRITIS, RIGHT HIP: ICD-10-CM

## 2018-06-28 DIAGNOSIS — Z90.89 ACQUIRED ABSENCE OF OTHER ORGANS: Chronic | ICD-10-CM

## 2018-06-28 DIAGNOSIS — Z98.89 OTHER SPECIFIED POSTPROCEDURAL STATES: Chronic | ICD-10-CM

## 2018-06-28 DIAGNOSIS — Z96.642 PRESENCE OF LEFT ARTIFICIAL HIP JOINT: ICD-10-CM

## 2018-06-28 DIAGNOSIS — Z90.49 ACQUIRED ABSENCE OF OTHER SPECIFIED PARTS OF DIGESTIVE TRACT: Chronic | ICD-10-CM

## 2018-06-28 PROCEDURE — 99213 OFFICE O/P EST LOW 20 MIN: CPT

## 2018-06-28 PROCEDURE — 73502 X-RAY EXAM HIP UNI 2-3 VIEWS: CPT

## 2018-06-28 PROCEDURE — 73502 X-RAY EXAM HIP UNI 2-3 VIEWS: CPT | Mod: 26,LT

## 2018-06-28 RX ORDER — MELOXICAM 15 MG/1
15 TABLET ORAL
Qty: 30 | Refills: 1 | Status: ACTIVE | COMMUNITY
Start: 2018-06-28 | End: 1900-01-01

## 2018-07-23 PROBLEM — M16.11 PRIMARY LOCALIZED OSTEOARTHROSIS OF PELVIC REGION, RIGHT: Status: ACTIVE | Noted: 2018-04-19

## 2019-08-06 ENCOUNTER — OUTPATIENT (OUTPATIENT)
Dept: OUTPATIENT SERVICES | Facility: HOSPITAL | Age: 64
LOS: 1 days | End: 2019-08-06
Payer: COMMERCIAL

## 2019-08-06 DIAGNOSIS — Z98.89 OTHER SPECIFIED POSTPROCEDURAL STATES: Chronic | ICD-10-CM

## 2019-08-06 DIAGNOSIS — Z90.89 ACQUIRED ABSENCE OF OTHER ORGANS: Chronic | ICD-10-CM

## 2019-08-06 DIAGNOSIS — Z90.49 ACQUIRED ABSENCE OF OTHER SPECIFIED PARTS OF DIGESTIVE TRACT: Chronic | ICD-10-CM

## 2019-08-06 PROCEDURE — 73560 X-RAY EXAM OF KNEE 1 OR 2: CPT | Mod: 26,RT

## 2019-08-06 PROCEDURE — 73560 X-RAY EXAM OF KNEE 1 OR 2: CPT

## 2019-11-19 ENCOUNTER — APPOINTMENT (OUTPATIENT)
Dept: ORTHOPEDIC SURGERY | Facility: CLINIC | Age: 64
End: 2019-11-19
Payer: COMMERCIAL

## 2019-11-19 VITALS — HEIGHT: 71 IN | BODY MASS INDEX: 35 KG/M2 | WEIGHT: 250 LBS

## 2019-11-19 VITALS — HEART RATE: 61 BPM | DIASTOLIC BLOOD PRESSURE: 76 MMHG | SYSTOLIC BLOOD PRESSURE: 127 MMHG

## 2019-11-19 PROCEDURE — 73030 X-RAY EXAM OF SHOULDER: CPT | Mod: RT

## 2019-11-19 PROCEDURE — 99214 OFFICE O/P EST MOD 30 MIN: CPT

## 2019-11-19 PROCEDURE — 72040 X-RAY EXAM NECK SPINE 2-3 VW: CPT

## 2019-11-19 RX ORDER — MELOXICAM 15 MG/1
15 TABLET ORAL DAILY
Qty: 20 | Refills: 1 | Status: ACTIVE | COMMUNITY
Start: 2019-11-19 | End: 1900-01-01

## 2019-11-19 RX ORDER — MELOXICAM 15 MG/1
15 TABLET ORAL DAILY
Qty: 30 | Refills: 1 | Status: COMPLETED | COMMUNITY
Start: 2018-04-19 | End: 2019-11-19

## 2019-11-19 RX ORDER — AMOXICILLIN 500 MG/1
500 TABLET, FILM COATED ORAL
Qty: 4 | Refills: 1 | Status: COMPLETED | COMMUNITY
Start: 2017-07-13 | End: 2019-11-19

## 2019-11-19 RX ORDER — METHYLPREDNISOLONE 4 MG/1
4 TABLET ORAL
Qty: 21 | Refills: 0 | Status: COMPLETED | COMMUNITY
Start: 2017-03-13 | End: 2019-11-19

## 2019-11-19 RX ORDER — UREA 39 G/100G
39 CREAM TOPICAL
Qty: 227 | Refills: 0 | Status: COMPLETED | COMMUNITY
Start: 2017-01-27 | End: 2019-11-19

## 2019-11-19 NOTE — HISTORY OF PRESENT ILLNESS
[de-identified] : Mr. Ferreira is a 64-year-old right-hand-dominant gentleman who comes in with pain in his RIGHT shoulder that began several Months ago without any injury or inciting event. He has pain both in the shoulder and upper arm but also in the neck and pain that can sometimes radiate down to the hand. He's had some intermittent numbness and tinglingin the RIGHT arm where it feels like it's falling asleep.\par Pain is variable and can be mild at 2/10 when sitting but sharper shooting intermittently particularly with certain movements. He cannot sleep on his RIGHT shoulder due to pain. It hurts putting on echo. He had taken a prednisone steroid pack a couple weeks ago for his psoriasis which didn't seem to help his pain. He takes intermittent meloxicam, naproxen or ibuprofen which don't seem to help very much. He carries a 26 pound backpack for work and putting it on and off can be painful.

## 2019-11-19 NOTE — PHYSICAL EXAM
[UE] : Sensory: Intact in bilateral upper extremities [Rad] : radial 2+ and symmetric bilaterally [Normal Touch] : sensation intact for touch [Normal] : No swelling, no edema, normal pedal pulses and normal temperature [de-identified] : Cervical spine and RIGHT shoulder:\par Cervical spine is with Some mildly decreased range of motion/stiffness in the cervical spine but no severe pain.\par Tender in the cervical spine RIGHT and LEFT sides. Mildly tender in the trapezius.\par Normal appearance. \par AROM: 180 FE, IR to T 10 equal but more painful on the RIGHT , 180 abd.\par PROM: 180 FE, 55 RIGHT versus 70 Degrees LEFT ER at the side, 90 ER and 35-40 IR in the 90 degree abducted position.\par Motor:  5-/5  supraspinatus RIGHT versus 5/5 LEFT,  5/5 ER, 5/5 IR, 4/5 biceps RIGHT versus 5/5 LEFT, 5/5 deltoid.  Normal lift off test\par Mild pain with Neer and Tena RIGHT shoulder. -  X-arm.   - [place]'s,  - Speeds.\par Tender minimally over the biceps tendon and the anterior shoulder.  Nontender a.c. joint\par Laxity: normal.\par No scapular winging.\par Sensation is intact distally in the UE.\par Skin is intact in the UE. \par Intact Motor distally.\par  [de-identified] : patches of psoriasis on extensor forearms. [de-identified] : No respiratory distress [de-identified] : \par X-rays RIGHT shoulder 3 views today show Minimal productive changes glenohumeral joint. No fractures seen. Mild arthritis at the a.c. joint.\par X-rays of the cervical spine AP and lateral views today show mild degenerative disc disease and spondylosis.

## 2019-11-19 NOTE — ASSESSMENT
[FreeTextEntry1] : 64-year-old gentleman comes in with several months of pain in the RIGHT shoulder and upper extremity. He seems to have some rotator cuff tendinopathy but also some underlying cervical spondylosis. With the numbness and tingling and mild weakness of the biceps there may be some Cervical radiculopathy.\par I recommended first tried some physical therapy to see if this helps his pain and symptoms. If he is having ongoing symptoms I would get an MRI of both the cervical spine and the shoulder.He should followup in 6-8 weeks. He can take meloxicam one tablet per day for a couple weeks as an anti-inflammatory. Heat and ice. Avoid painful activities do not aggravate the shoulder.

## 2020-01-13 PROBLEM — M62.81 MUSCLE WEAKNESS OF RIGHT UPPER EXTREMITY: Status: ACTIVE | Noted: 2019-11-19

## 2020-01-14 ENCOUNTER — APPOINTMENT (OUTPATIENT)
Dept: ORTHOPEDIC SURGERY | Facility: CLINIC | Age: 65
End: 2020-01-14
Payer: COMMERCIAL

## 2020-01-14 DIAGNOSIS — M62.81 MUSCLE WEAKNESS (GENERALIZED): ICD-10-CM

## 2020-01-14 DIAGNOSIS — M48.02 SPINAL STENOSIS, CERVICAL REGION: ICD-10-CM

## 2020-01-14 PROCEDURE — 99214 OFFICE O/P EST MOD 30 MIN: CPT

## 2020-01-14 NOTE — REASON FOR VISIT
[Follow-Up Visit] : a follow-up visit for [Shoulder Pain] : shoulder pain [Spouse] : spouse [FreeTextEntry2] : neck

## 2020-01-14 NOTE — HISTORY OF PRESENT ILLNESS
[de-identified] : Cervical spine and RIGHT shoulder are feeling about the same. His wife states that he hasn't been complaining of as much pain.\par He gets intermittent pain in the RIGHT shoulder with variable pain around the shoulder with certain movements. Putting on a coat can cause pain.He lifts some very heavy bags swinging them around when he goes to work and is wondering if this is aggravating the condition.\par Tingling down the arms is less.\par He went to PT and did modalities and some exercises. \par He takes occasional meloxicam about once a week.

## 2020-01-14 NOTE — PHYSICAL EXAM
[Normal] : Gait: normal [UE] : 5/5 motor strength in bilateral upper extremities [Normal Touch] : sensation intact for touch [Normal LUE] : Left Upper Extremity: No scars, rashes, lesions, ulcers, skin intact [Normal RUE] : Right Upper Extremity: No scars, rashes, lesions, ulcers, skin intact [de-identified] : Cervical spine and RIGHT shoulder:\par Cervical spine is with Minimal right-sided tenderness \par Cervical ROM is within normal limits With discomfort on full extension to the RIGHT but no severe pain. Negative [place]'s..\par Normal appearance. \par AROM: 180 FE, IR to T 9 , 180 abd.\par PROM: 180 FE, 70 ER at the side, 90 ER and 30 IR in the 90 degree abducted position.\par Motor:  5/5  supraspinatus,  5/5 ER, 5/5 IR, 5/5 biceps, 5/5 deltoid.  Normal lift off test\par Mild discomfort with Neer, - Tena. -  X-arm.   - [place]'s, + Speeds.\par Tender Minimally over the biceps tendon and the anterior shoulder.  \par No scapular winging.\par Sensation is intact distally in the UE.\par Skin is intact in the UE. \par Intact Motor distally.\par  [de-identified] : \par We reviewed results of the MRIs and he was given copies of that reports which are scanned into the record.\par Degenerative tearing of the labrum anteriorly, superiorly and posteriorly. Tendinosis in the rotator cuff and subacromial bursitis. Arthritis of the a.c. joint. Intrasubstance tear in the superior subscapularis and some mild medial subluxation long head of the biceps and probable fraying of the biceps tendon. Small joint effusion.\par \par MRI of the cervical spine shows a foraminal disc herniation C4-C5 with mild to moderate central and LEFT lateral recess stenosis and there is acquired superimposed on congenital multilevel degenerative stenosis and loss of normal lordosis

## 2020-01-14 NOTE — ASSESSMENT
[FreeTextEntry1] : 64-year-old with cervical spondylosis and stenosis and RIGHT shoulder Rotator cuff tendinopathy, a degenerative labral tears and degeneration in the long head of the biceps insertion and a.c. arthrosis which may be causing some underlying impingement.\par Symptoms seem somewhat less today than last visit. He's going to try different physical therapy and I suggested taking an anti-inflammatory consistently for a couple weeks to see if this quiets down the inflammation and pain. He could take either meloxicam or an over-the-counter NSAIDs. Heat and ice. He should be more careful about how he is lifting heavy objects and avoid straining the shoulder.\par His pain is ongoing we could consider corticosteroid injection or ultimately could consider surgical treatment for the shoulder if that seems to be the main source of pain. His nerve symptoms seem to have subsided.\par Followup in about 6 wks

## 2020-03-09 PROBLEM — M67.911 TENDINOPATHY OF RIGHT ROTATOR CUFF: Status: ACTIVE | Noted: 2019-11-19

## 2020-03-09 PROBLEM — S46.111A LABRAL TEAR OF LONG HEAD OF BICEPS TENDON, RIGHT, INITIAL ENCOUNTER: Status: ACTIVE | Noted: 2020-01-14

## 2020-03-10 ENCOUNTER — APPOINTMENT (OUTPATIENT)
Dept: ORTHOPEDIC SURGERY | Facility: CLINIC | Age: 65
End: 2020-03-10
Payer: COMMERCIAL

## 2020-03-10 VITALS — BODY MASS INDEX: 35 KG/M2 | WEIGHT: 250 LBS | HEIGHT: 71 IN

## 2020-03-10 DIAGNOSIS — M75.41 IMPINGEMENT SYNDROME OF RIGHT SHOULDER: ICD-10-CM

## 2020-03-10 DIAGNOSIS — M67.911 UNSPECIFIED DISORDER OF SYNOVIUM AND TENDON, RIGHT SHOULDER: ICD-10-CM

## 2020-03-10 DIAGNOSIS — Z85.46 PERSONAL HISTORY OF MALIGNANT NEOPLASM OF PROSTATE: ICD-10-CM

## 2020-03-10 DIAGNOSIS — S46.111A STRAIN OF MUSCLE, FASCIA AND TENDON OF LONG HEAD OF BICEPS, RIGHT ARM, INITIAL ENCOUNTER: ICD-10-CM

## 2020-03-10 DIAGNOSIS — M43.02 SPONDYLOLYSIS, CERVICAL REGION: ICD-10-CM

## 2020-03-10 PROCEDURE — 20610 DRAIN/INJ JOINT/BURSA W/O US: CPT | Mod: RT

## 2020-03-10 PROCEDURE — 99213 OFFICE O/P EST LOW 20 MIN: CPT | Mod: 25

## 2020-03-10 NOTE — ASSESSMENT
[FreeTextEntry1] : 65 yo with right shoulder chronic pain w/ labral tearing likely degenerative or acute on chronic, SLAP tear with moderate tendinosis and partial tearing Subscapularis tendon upper fibers with some subluxation of the biceps, and  a.c. joint arthrosis.\par his shoulder is still hurting but he hasn't been recent physical therapy. He is going to try the therapy. Today we did a corticosteroid injection to see if this helps his pain. I would not do repeat injections which can weaken the rotator cuff.\par He is likely having a prostatectomy in a few months and obviously we would not consider surgery on the shoulder until he is fully recovered from that procedure Hopefully the injection will give him some relief in the meantime.\par He can take the NSAIDs as needed. Heat and ice as needed.Followup as needed depending on his other medical issues.

## 2020-03-10 NOTE — PHYSICAL EXAM
[Normal] : Gait: normal [UE] : Sensory: Intact in bilateral upper extremities [Normal RUE] : Right Upper Extremity: No scars, rashes, lesions, ulcers, skin intact [Normal LUE] : Left Upper Extremity: No scars, rashes, lesions, ulcers, skin intact [Normal Touch] : sensation intact for touch [Rad] : radial 2+ and symmetric bilaterally [de-identified] : Cervical spine and RIGHT shoulder:\par Cervical spine is with Minimal right-sided tenderness \par Cervical ROM is within normal limits With discomfort on full extension to the RIGHT but no severe pain. Negative Spurling's..\par Normal appearance. \par AROM: 180 FE, IR to T 9 , 180 abd.\par PROM: 180 FE, 70 ER at the side, 90 ER and 30 IR in the 90 degree abducted position.\par Motor:  5/5  supraspinatus,  5/5 ER, 5/5 IR, 5/5 biceps, 5/5 deltoid.  Normal lift off test\par Mild discomfort with Neer, + Tena. -  X-arm.   - Simla's, + Speeds.\par Tender Minimally over the biceps tendon and the anterior shoulder.  \par No scapular winging.\par Sensation is intact distally in the UE.\par Skin is intact in the UE. \par Intact Motor distally.\par  [de-identified] : \par MRI of RIGHT shoulder from January 9, 2020:\par Degenerative tearing of the labrum anteriorly, superiorly and posteriorly. Tendinosis in the rotator cuff and subacromial bursitis. Arthritis of the a.c. joint. Intrasubstance tear in the superior subscapularis and some mild medial subluxation long head of the biceps and probable fraying of the biceps tendon. Small joint effusion.\par \par MRI of the cervical spine shows a foraminal disc herniation C4-C5 with mild to moderate central and LEFT lateral recess stenosis and there is acquired superimposed on congenital multilevel degenerative stenosis and loss of normal lordosis

## 2020-03-10 NOTE — PROCEDURE
[Injection] : Injection [Bleeding] : bleeding [Infection] : infection [Allergic Reaction] : allergic reaction [Patient] : patient [Risk] : risk [Benefits] : benefits [Alternatives] : alternatives [Verbal Consent Obtained] : verbal consent was obtained prior to the procedure [Alcohol] : alcohol [Betadine] : betadine [Ethyl Chloride Spray] : ethyl chloride spray was used as a topical anesthetic [1% Lidocaine___(mL)] : [unfilled] mL of 1% Lidocaine [Methylpred. 40mg/mL___(mL)] : [unfilled] mL 40mg/mL methylprednisolone [Bandage Applied] : a bandage [Tolerated Well] : The patient tolerated the procedure well [None] : none [No Strenuous Activity___day(s)] : avoid strenuous activity for [unfilled] day(s) [PRN] : PRN [Right] : of the right [Subacromial Bursa] : subacromial bursa [Inflammation] : inflammation [Joint Pain] : joint pain [Posterior] : posterior [22] : a 22-gauge

## 2020-03-10 NOTE — HISTORY OF PRESENT ILLNESS
[de-identified] : Cervical spine and RIGHT shoulder are feeling a bit worse. He avoids using the right arm and disease in the LEFT side more.. \par He did not go to PT b/c he was diagnosed with Prostate Ca. He has been busy seeing doctors figuring out what treatment he is going to have but it looks like he's going to do a prostatectomy probably around June.\par He took meloxicam consistently for 6-8 wks. . he doesn't think it helped her shoulder pain very much.\par Pain is aggravated by movement.Crossing his arms in front of him can bring out pain.\par He hasn't been experiencing as much tingling Upper extremities.. his neck is not hurting significantly at all.

## 2020-09-03 NOTE — BRIEF OPERATIVE NOTE - PRIMARY SURGEON
[address]  eMERGENCY dEPARTMENT eNCOUnter      Pt Name: Christine Machuca  MRN: [id number]  [place] 1955  Date of evaluation: 9/2/20    CHIEF COMPLAINT       Chief Complaint   Patient presents with    Laceration     HISTORY OF PRESENT ILLNESS   HPI 60 y.o. male presents for evaluation of bleeding leg wound. The patient reports that just prior to arrival he was at work and noticed blood on the floor. He then realized he was bleeding from his left lower leg. He thinks he hit his leg on something, but he's not sure what. Unknown last tetanus. He denies any pain. He denies any history of bleeding disorders. He denies any lightheadedness or dizziness. His leg was bandaged with a compression dressing and the patient was sent to the emergency department. REVIEW OF SYSTEMS       Review of Systems   HENT: Negative for nosebleeds. Respiratory: Negative for shortness of breath. Cardiovascular: Negative for chest pain. Genitourinary: Negative for hematuria. Skin: Positive for rash and wound. Neurological: Negative for weakness and light-headedness. PAST MEDICAL HISTORY     Past Medical History:   Diagnosis Date    Abdominal pain     Colon polyp     Pulmonary nodules 9/17/2013    Thyroid nodule 9/17/2013       SURGICAL HISTORY       Past Surgical History:   Procedure Laterality Date    COLONOSCOPY  12/12/11    POLYPECTOMY    COLONOSCOPY  10/23/07    POLYPECTOMY    CYST REMOVAL      \"back side\"   and face and neck    POLYPECTOMY  12/12/11       CURRENT MEDICATIONS       Discharge Medication List as of 9/2/2020  9:41 AM      CONTINUE these medications which have NOT CHANGED    Details   Phenylephrine-DM-GG-APAP (MUCINEX FAST-MAX COLD FLU PO) Take by mouthHistorical Med      Ascorbic Acid (VITAMIN C) 500 MG tablet Take 500 mg by mouth daily. Historical Med      Multiple Vitamin (MULTIVITAMIN PO) Take  by mouth.   Historical Med      Cyanocobalamin (VITAMIN B 12 PO) Take  by Hepinstall mouth.  Historical Med      VITAMIN E PO Take  by mouth Daily. Historical Med      fish oil-omega-3 fatty acids 1000 MG capsule Take 2 g by mouth daily. Historical Med             ALLERGIES     has No Known Allergies. FAMILY HISTORY     has no family status information on file. SOCIAL HISTORY      reports that he quit smoking about 19 years ago. He has a 30.00 pack-year smoking history. He has never used smokeless tobacco. He reports current alcohol use. PHYSICAL EXAM     INITIAL VITALS: BP (!) 149/75   Pulse 76   Temp 97.9 °F (36.6 °C) (Oral)   Resp 16   Ht 5' 11\" (1.803 m)   Wt 162 lb (73.5 kg)   SpO2 98%   BMI 22.59 kg/m²   Gen: NAD  Head: Normocephalic, atraumatic  Eye: Pupils equal round reactive to light, no conjunctivitis  Heart: Regular rate and rhythm no murmurs  Lungs: Clear to auscultation bilaterally, no respiratory distress  Chest wall: No crepitus, no tenderness palpation  Abdomen: Soft, nontender, nondistended, with no peritoneal signs  Neurologic: Patient is alert and oriented x3, motor and sensation is intact in all 4 extremities, cerebellar function is normal  Extremities: Small needle tip puncture wound to left lower leg, no tttp. Dp/pt pulses intact. No active bleeding. MEDICAL DECISION MAKING:     MDM  59 yo M with bleeding leg wound. No laceration requiring closure. Tetanus updated. Pt monitored. No recurrence of bleeding. Laboratory studies obtained to r/o anemia, thrombocytopenia, or coagulopathy and were unremarkable. D/w pt the results, treatment plan, warning precautions for prompt ED return and importance of close OP FU, he verbalizes understanding and agrees with the treatment plan. DIAGNOSTIC RESULTS       LABS: All lab results were reviewed by myself, and all abnormals are listed below.   Labs Reviewed   CBC WITH AUTO DIFFERENTIAL - Abnormal; Notable for the following components:       Result Value    MCV 79.9 (*)     RDW 16.3 (*)     Lymphocytes 12 (*)     Monocytes 11 (*)     Eosinophils % 11 (*)     Absolute Lymph # 0.80 (*)     Absolute Eos # 0.70 (*)     All other components within normal limits   COMPREHENSIVE METABOLIC PANEL - Abnormal; Notable for the following components:    Glucose 101 (*)     CREATININE 0.56 (*)     Total Protein 6.1 (*)     All other components within normal limits   PROTIME-INR       EMERGENCY DEPARTMENT COURSE:   Vitals:    Vitals:    09/02/20 0804   BP: (!) 149/75   Pulse: 76   Resp: 16   Temp: 97.9 °F (36.6 °C)   TempSrc: Oral   SpO2: 98%   Weight: 162 lb (73.5 kg)   Height: 5' 11\" (1.803 m)       The patient was given the following medications while in the emergency department:  Orders Placed This Encounter   Medications    Tetanus-Diphth-Acell Pertussis (BOOSTRIX) injection 0.5 mL     -------------------------  CRITICAL CARE:   CONSULTS: None  PROCEDURES: Procedures     FINAL IMPRESSION      1.  Bleeding from varicose vein          DISPOSITION/PLAN   DISPOSITION Decision To Discharge 09/02/2020 09:27:49 AM      PATIENT REFERRED TO:  Your Primary Care Physician            DISCHARGE MEDICATIONS:  Discharge Medication List as of 9/2/2020  9:41 AM            Lukasz Lopez MD  Attending Emergency Physician                     Lukasz Lopez MD  09/03/20 0002

## 2020-11-23 ENCOUNTER — RX RENEWAL (OUTPATIENT)
Age: 65
End: 2020-11-23

## 2022-09-12 NOTE — DISCHARGE NOTE ADULT - FUNCTIONAL SCREEN CURRENT LEVEL: BATHING, MLM
Addended by: JANUSZ JOSÉ on: 9/12/2022 01:36 PM     Modules accepted: Orders     (2) assistive person

## 2023-06-05 ENCOUNTER — NON-APPOINTMENT (OUTPATIENT)
Age: 68
End: 2023-06-05

## 2023-06-06 ENCOUNTER — APPOINTMENT (OUTPATIENT)
Dept: VASCULAR SURGERY | Facility: CLINIC | Age: 68
End: 2023-06-06
Payer: COMMERCIAL

## 2023-06-06 VITALS
HEART RATE: 59 BPM | WEIGHT: 250 LBS | DIASTOLIC BLOOD PRESSURE: 80 MMHG | HEIGHT: 71 IN | SYSTOLIC BLOOD PRESSURE: 118 MMHG | BODY MASS INDEX: 35 KG/M2

## 2023-06-06 PROCEDURE — 93971 EXTREMITY STUDY: CPT

## 2023-06-06 PROCEDURE — 99204 OFFICE O/P NEW MOD 45 MIN: CPT

## 2023-06-07 ENCOUNTER — NON-APPOINTMENT (OUTPATIENT)
Age: 68
End: 2023-06-07

## 2023-06-08 NOTE — PHYSICAL EXAM
[Respiratory Effort] : normal respiratory effort [No Rash or Lesion] : No rash or lesion [Alert] : alert [Oriented to Person] : oriented to person [Oriented to Place] : oriented to place [Oriented to Time] : oriented to time [Calm] : calm [2+] : left 2+ [Ankle Swelling (On Exam)] : present [Ankle Swelling Bilaterally] : bilaterally  [Varicose Veins Of Lower Extremities] : present [Varicose Veins Of The Left Leg] : of the left leg [Ankle Swelling On The Left] : moderate [] : bilaterally [Ankle Swelling On The Right] : mild [Abdomen Tenderness] : ~T ~M No abdominal tenderness [de-identified] : WN/WD [FreeTextEntry1] : Bilt LEs warm to touch with intact arterial pulses. Both LEs with swelling below the knees, worse on the left side as well as hyperpigmentation of the skin c/w venous stasis. No open sores or wounds. LLE medial thigh erythematous with indurated, palpable cord running down to varicose veins in the calf (medial/posterior aspects) which are also indurated. Scattered small spider veins on both LEs.  [de-identified] : overweight [de-identified] : FROM [de-identified] : See cardiovascular section

## 2023-06-08 NOTE — PROCEDURE
[FreeTextEntry1] : LLE Venous duplex ordered to r/o SVT/SVT, shows: negative DVT. Positive SVT from proximal thigh to the varicose veins in the calf. NO evidence of thrombus propagation into the CFV but thrombus close to junction. Short segment SVT also seen in SSV.

## 2023-06-08 NOTE — ASSESSMENT
[Arterial/Venous Disease] : arterial/venous disease [Medication Management] : medication management [FreeTextEntry1] : 67 y/o M w/ hx of portal vein thrombosis 2014 (on Lovenox for 1 yr, unclear etiology), [place] [facility] [place] and now with significant SVT in LLE GSV (close to CFV junction), varicose veins and SSV. \par \par On exam, bilt LEs warm to touch with intact arterial pulses. Both LEs with swelling below the knees, worse on the left side as well as hyperpigmentation of the skin c/w venous stasis. No open sores or wounds. LLE medial thigh erythematous with indurated, palpable cord running down to varicose veins in the calf (medial/posterior aspects) which are also indurated. Scattered small spider veins on both LEs. \par \par Venous duplex showed no evidence of LLE DVT. Positive SVT from proximal thigh to the varicose veins in the calf. No evidence of thrombus propagation into the CFV but thrombus close to junction. Short segment SVT also seen in SSV. \par \par Given pt's history of thrombotic events and today's finding/assessment of SVT in GSV with close proximity to CFV junction, anticoagulation recommended. Pt will be started on Eliquis 5mg BID (Rx sent to local pharmacy). \par Risk, complications and alternatives were discussed, including risk of DVT/PE and bleeding. All questions were answered. Pt recommended to start also applying warm compresses on the area for 20-30min daily to twice a daily. Compression stockings 20-30mmHg to be worn  daily (new Rx provided) especially given his job this should be something he continues to use forever. Dr Ramírez's office contacted given recent colonoscopy and GI hx, spoke with RN and pt ok to start on oral anticoag from a GI perspective. Call placed for Dr Kelly Emery (hematologist/oncologist) to f/u and complete hypercoagulable work-up. Pt will e updated via phone with Heme's recs. F/u Monday for repeat US to ensure there is no thrombus propagation. \par

## 2023-06-08 NOTE — HISTORY OF PRESENT ILLNESS
[FreeTextEntry1] : 69 y/o M w/ former smoker (30 pack yrs) and PMHx of L hip replacement (2017), spinal stenosis, bladder/prostate cancer (s/p bladder ablation and prostatectomy 2011/2020), psoriasis, portal vein thrombosis 2014 (on Lovenox for 1 yr, unclear etiology), LLE DVT/PE (in 2020 at [facility] [place] after prostatectomy surgery, on Lovenox), colon hemicolectomy and subtotal colectomy (1999 and 2020, pt followed by Dr Ramírez at [facility] [place], IBD specialist, ?Crohn's dx). \par \par He presents with concerns left leg SVT/DVT. Pt reports he has always had a large vein on the left leg. He explains playing sport in the past and the vein would be visible and bulging. With age and weight gain, he explains the veins became a little less visible. He has had a hx of thrombotic episodes (noted in hx of above) and both times treated with Lovenox, no use of oral anticoagulation and unknown hx of hematological hypercoagulable disorder. He had been followed the past by a hematologist/oncologist at [facility] [place]. A week ago the left leg medially became red, swollen and painful. The symptoms progressed to the calf as well. He reports going to [facility] [place] [facility] last Friday 6/2 and an ultrasound was completed. He as told no blood clots were present. 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 He does wear compression stockings. Denies any SOB, CP, trauma to the leg, fever/chills or palpitations. No hx of brain bleed, GI bleeding or recent weight loss. Last colonoscopy was May 11th, +inflammatory process/colitis ?Crohn's, being followed at [facility] [place] by IBD specialist and new medication will be started soon. \par \par \par SHx:\par -Works as a \par -Former smoker\par \par FHx:\par -Mother: passed in her 80s\par -Father: passed in his 90s (no hx of colon ca but half of father's siblings had colon cancer)\par -Maternal grandfather and paternal uncle: hx of CAD/MI

## 2023-06-12 ENCOUNTER — APPOINTMENT (OUTPATIENT)
Dept: VASCULAR SURGERY | Facility: CLINIC | Age: 68
End: 2023-06-12
Payer: COMMERCIAL

## 2023-06-12 DIAGNOSIS — Z86.711 PERSONAL HISTORY OF PULMONARY EMBOLISM: ICD-10-CM

## 2023-06-12 DIAGNOSIS — Z91.89 OTHER SPECIFIED PERSONAL RISK FACTORS, NOT ELSEWHERE CLASSIFIED: ICD-10-CM

## 2023-06-12 DIAGNOSIS — I80.9 PHLEBITIS AND THROMBOPHLEBITIS OF UNSPECIFIED SITE: ICD-10-CM

## 2023-06-12 DIAGNOSIS — I81 PORTAL VEIN THROMBOSIS: ICD-10-CM

## 2023-06-12 DIAGNOSIS — Z87.891 PERSONAL HISTORY OF NICOTINE DEPENDENCE: ICD-10-CM

## 2023-06-12 DIAGNOSIS — Z86.718 PERSONAL HISTORY OF OTHER VENOUS THROMBOSIS AND EMBOLISM: ICD-10-CM

## 2023-06-12 PROCEDURE — 93971 EXTREMITY STUDY: CPT

## 2023-06-12 PROCEDURE — 99214 OFFICE O/P EST MOD 30 MIN: CPT

## 2023-06-13 PROBLEM — Z86.718 HISTORY OF DVT (DEEP VEIN THROMBOSIS): Status: RESOLVED | Noted: 2023-06-06 | Resolved: 2023-06-13

## 2023-06-13 PROBLEM — I80.9 THROMBOPHLEBITIS: Status: ACTIVE | Noted: 2023-06-06

## 2023-06-13 PROBLEM — Z86.711 HISTORY OF PULMONARY EMBOLISM: Status: RESOLVED | Noted: 2023-06-06 | Resolved: 2023-06-13

## 2023-06-13 PROBLEM — Z91.89 AT RISK FOR DEEP VENOUS THROMBOSIS: Status: ACTIVE | Noted: 2023-06-06

## 2023-06-13 PROBLEM — Z91.89 AT HIGH RISK FOR PULMONARY EMBOLISM: Status: ACTIVE | Noted: 2023-06-06

## 2023-06-13 NOTE — PHYSICAL EXAM
[Respiratory Effort] : normal respiratory effort [2+] : left 2+ [Ankle Swelling (On Exam)] : present [Ankle Swelling Bilaterally] : bilaterally  [Varicose Veins Of Lower Extremities] : present [Varicose Veins Of The Left Leg] : of the left leg [Ankle Swelling On The Left] : moderate [] : bilaterally [Ankle Swelling On The Right] : mild [Abdomen Tenderness] : ~T ~M No abdominal tenderness [No Rash or Lesion] : No rash or lesion [Alert] : alert [Oriented to Person] : oriented to person [Oriented to Place] : oriented to place [Oriented to Time] : oriented to time [Calm] : calm [de-identified] : WN/WD [FreeTextEntry1] : Bilt LEs warm to touch with intact arterial pulses. Both LEs with swelling below the knees, worse on the left side as well as hyperpigmentation of the skin c/w venous stasis. No open sores or wounds. LLE medial thigh with mild erythema and scratch marks from itching, skin is dry,; palpable indurated cord running down from thigh to varicose veins in the calf (medial/posterior aspects) with improvement. Scattered small spider veins on both LEs.  [de-identified] : overweight [de-identified] : FROM [de-identified] : See cardiovascular section

## 2023-06-13 NOTE — ASSESSMENT
[FreeTextEntry1] : 67 y/o M w/ hx of portal vein thrombosis 2014 (on Lovenox for 1 yr, unclear etiology), LLE DVT/PE (in 2020 at [facility] [place] after prostatectomy surgery, on Lovenox) and now with significant SVT in LLE GSV (close to CFV junction), varicose veins and SSV, on Eliquis since 6/6/23 and no evidence of thrombus propagation today. ?etiology of current SVT,  heme workup pending.\par \par On exam, bilt LEs warm to touch with intact arterial pulses. Both LEs with swelling below the knees, worse on the left side as well as hyperpigmentation of the skin c/w venous stasis. No open sores or wounds. LLE medial thigh with mild erythema and scratch marks from itching, skin is dry; palpable indurated cord running down from thigh to varicose veins in the calf (medial/posterior aspects) with improvement. Scattered small spider veins on both LEs. \par \par Venous duplex showed no evidence of LLE DVT. Positive SVT from proximal thigh to the varicose veins in the calf. No evidence of thrombus propagation into the CFV. Short segment SVT also seen in SSV. \par \par Pt to continue Eliquis 5mg BID and will likely keep on Eliqusos for one month but would prefer to have hypercoagulable workup done before we stop the Eliquis given unclear reason of current SVT and pt's previous thrombotic hx, especially portal thrombosis with also no clear etiology. Referral for new hematologist within the system provided. Pt will call to arrange f/u appt here after he arranges the appt with new hematologist. \par \par In the mean time, pt to continue applying warm compresses on the area for 20-30min daily to twice a daily. Compression stockings 20-30mmHg to be worn daily. Also, keep skin well moisturized daily. [Arterial/Venous Disease] : arterial/venous disease [Medication Management] : medication management

## 2023-06-13 NOTE — PROCEDURE
[FreeTextEntry1] : LLE Venous duplex ordered to r/o SVT/SVT, shows: negative DVT. Positive SVT from proximal thigh to the varicose veins in the calf. No evidence of thrombus propagation into the CFV. Short segment SVT also seen in SSV. No significant changes.

## 2023-06-13 NOTE — HISTORY OF PRESENT ILLNESS
[FreeTextEntry1] : 67 y/o M w/ former smoker (30 pack yrs) and PMHx of L hip replacement (2017), spinal stenosis, bladder/prostate cancer (s/p bladder ablation and prostatectomy 2011/2020), psoriasis, portal vein thrombosis 2014 (on Lovenox for 1 yr, unclear etiology), LLE DVT/PE (in 2020 at [facility] [place], on Lovenox), colon hemicolectomy and subtotal colectomy (1999 and 2020, pt followed by Dr Ramírez at [facility] [place], IBD specialist, ?Crohn's dx). \par \par He presented 6/6/23 with left leg extensive SVT affecting GSV, varicose veins and SSV. Given pt's history of thrombotic events and SVT in GSV with close proximity to CFV junction, he was started on Eliquis 5mg BID, warm compresses, compression stockings and full hypercoagulable work-up. Pt's heme/onc doctor from [facility] [place] was contacted, Dr Emery (hematologist/oncologist) and had mentioned they will be seeing the pt. GI specialist had also been contacted and agreed on AC therapy.\par \par Today, he presents to ensure there is no thrombus propagation. He has not been putting warm compresses daily because of his work schedule. He states the leg feels better but the thigh area is very itchy. No SOB, CP or palpitations. No side effects from Eliquis. He mentions the hematologist called him and left a voicemail but seemed they did not want to see him. \par \par \par SHx:\par -Works as a \par -Former smoker\par \par FHx:\par -Mother: passed in her 80s\par -Father: passed in his 90s (no hx of colon ca but half of father's siblings had colon cancer)\par -Maternal grandfather and paternal uncle: hx of CAD/MI

## 2023-08-29 RX ORDER — APIXABAN 5 MG/1
5 TABLET, FILM COATED ORAL
Qty: 60 | Refills: 3 | Status: ACTIVE | COMMUNITY
Start: 2023-06-06 | End: 1900-01-01

## 2023-09-15 ASSESSMENT — HOOS JR
IMPORTED HOOS JR SCORE: 67.52
BENDING TO THE FLOOR TO PICK UP OBJECT: MILD
WALKING ON UNEVEN SURFACE: MODERATE
BENDING TO THE FLOOR TO PICK UP OBJECT: MODERATE
WALKING ON UNEVEN SURFACE: SEVERE
IMPORTED FORM: YES
IMPORTED HOOS JR SCORE: 55.98
BENDING TO THE FLOOR TO PICK UP OBJECT: SEVERE
IMPORTED HOOS JR SCORE: 52.97
LYING IN BED (TURNING OVER, MAINTAINING HIP POSITION): MODERATE
LYING IN BED (TURNING OVER, MAINTAINING HIP POSITION): MILD
HOOS JR RAW SCORE: 18
SITTING: MODERATE
GOING UP OR DOWN STAIRS: SEVERE
RISING FROM SITTING: MODERATE
IMPORTED LATERALITY: LEFT
HOOS JR RAW SCORE: 11
LYING IN BED (TURNING OVER, MAINTAINING HIP POSITION): EXTREME
GOING UP OR DOWN STAIRS: MODERATE
IMPORTED HOOS JR SCORE: 32.73
WALKING ON UNEVEN SURFACE: MILD
HOOS JR RAW SCORE: 7
RISING FROM SITTING: SEVERE
HOOS JR RAW SCORE: 12
RISING FROM SITTING: MILD
SITTING: MILD
IMPORTED HOOS JR SCORE: 58.93
HOOS JR RAW SCORE: 10